# Patient Record
Sex: FEMALE | Race: BLACK OR AFRICAN AMERICAN | NOT HISPANIC OR LATINO | Employment: FULL TIME | URBAN - METROPOLITAN AREA
[De-identification: names, ages, dates, MRNs, and addresses within clinical notes are randomized per-mention and may not be internally consistent; named-entity substitution may affect disease eponyms.]

---

## 2019-10-14 ENCOUNTER — HISTORICAL (OUTPATIENT)
Dept: RADIOLOGY | Facility: HOSPITAL | Age: 54
End: 2019-10-14

## 2019-10-14 LAB — OCCULT BLOOD, FECAL, IA: NEGATIVE

## 2019-10-22 ENCOUNTER — HISTORICAL (OUTPATIENT)
Dept: RADIOLOGY | Facility: HOSPITAL | Age: 54
End: 2019-10-22

## 2019-12-13 LAB — POC BETA-HCG (QUAL): NEGATIVE

## 2020-03-12 ENCOUNTER — HISTORICAL (OUTPATIENT)
Dept: ADMINISTRATIVE | Facility: HOSPITAL | Age: 55
End: 2020-03-12

## 2020-03-12 LAB
ABS NEUT (OLG): 2.91 X10(3)/MCL (ref 2.1–9.2)
BASOPHILS # BLD AUTO: 0 X10(3)/MCL (ref 0–0.2)
BASOPHILS NFR BLD AUTO: 0 %
BUN SERPL-MCNC: 12 MG/DL (ref 7–18)
CALCIUM SERPL-MCNC: 9.3 MG/DL (ref 8.5–10.1)
CHLORIDE SERPL-SCNC: 107 MMOL/L (ref 98–107)
CO2 SERPL-SCNC: 31 MMOL/L (ref 21–32)
CREAT SERPL-MCNC: 0.9 MG/DL (ref 0.6–1.3)
CREAT/UREA NIT SERPL: 13
EOSINOPHIL # BLD AUTO: 0.1 X10(3)/MCL (ref 0–0.9)
EOSINOPHIL NFR BLD AUTO: 2 %
ERYTHROCYTE [DISTWIDTH] IN BLOOD BY AUTOMATED COUNT: 13.4 % (ref 11.5–14.5)
GLUCOSE SERPL-MCNC: 88 MG/DL (ref 74–106)
HCT VFR BLD AUTO: 41.4 % (ref 35–46)
HGB BLD-MCNC: 13.4 GM/DL (ref 12–16)
IMM GRANULOCYTES # BLD AUTO: 0.01 10*3/UL
IMM GRANULOCYTES NFR BLD AUTO: 0 %
LYMPHOCYTES # BLD AUTO: 2.3 X10(3)/MCL (ref 0.6–4.6)
LYMPHOCYTES NFR BLD AUTO: 39 %
MCH RBC QN AUTO: 28.9 PG (ref 26–34)
MCHC RBC AUTO-ENTMCNC: 32.4 GM/DL (ref 31–37)
MCV RBC AUTO: 89.2 FL (ref 80–100)
MONOCYTES # BLD AUTO: 0.5 X10(3)/MCL (ref 0.1–1.3)
MONOCYTES NFR BLD AUTO: 9 %
NEUTROPHILS # BLD AUTO: 2.91 X10(3)/MCL (ref 2.1–9.2)
NEUTROPHILS NFR BLD AUTO: 49 %
PLATELET # BLD AUTO: 271 X10(3)/MCL (ref 130–400)
PMV BLD AUTO: 10.2 FL (ref 7.4–10.4)
POTASSIUM SERPL-SCNC: 4.2 MMOL/L (ref 3.5–5.1)
RBC # BLD AUTO: 4.64 X10(6)/MCL (ref 4–5.2)
SODIUM SERPL-SCNC: 140 MMOL/L (ref 136–145)
WBC # SPEC AUTO: 5.9 X10(3)/MCL (ref 4.5–11)

## 2021-01-29 LAB
HPV16+18+H RISK 12 DNA CVX-IMP: NEGATIVE
PAP RECOMMENDATION EXT: NORMAL
PAP SMEAR: NORMAL

## 2021-02-17 ENCOUNTER — HISTORICAL (OUTPATIENT)
Dept: ADMINISTRATIVE | Facility: HOSPITAL | Age: 56
End: 2021-02-17

## 2021-02-17 LAB
ABS NEUT (OLG): 2.08 X10(3)/MCL (ref 2.1–9.2)
ALBUMIN SERPL-MCNC: 3.7 GM/DL (ref 3.5–5)
ALBUMIN/GLOB SERPL: 1.1 RATIO (ref 1.1–2)
ALP SERPL-CCNC: 76 UNIT/L (ref 40–150)
ALT SERPL-CCNC: 13 UNIT/L (ref 0–55)
APPEARANCE, UA: CLEAR
AST SERPL-CCNC: 13 UNIT/L (ref 5–34)
BACTERIA #/AREA URNS AUTO: ABNORMAL /HPF
BASOPHILS # BLD AUTO: 0 X10(3)/MCL (ref 0–0.2)
BASOPHILS NFR BLD AUTO: 1 %
BILIRUB SERPL-MCNC: 0.4 MG/DL
BILIRUB UR QL STRIP: NEGATIVE
BILIRUBIN DIRECT+TOT PNL SERPL-MCNC: 0.1 MG/DL (ref 0–0.5)
BILIRUBIN DIRECT+TOT PNL SERPL-MCNC: 0.3 MG/DL (ref 0–0.8)
BUN SERPL-MCNC: 12 MG/DL (ref 9.8–20.1)
CALCIUM SERPL-MCNC: 9.2 MG/DL (ref 8.4–10.2)
CHLORIDE SERPL-SCNC: 106 MMOL/L (ref 98–107)
CHOLEST SERPL-MCNC: 234 MG/DL
CHOLEST/HDLC SERPL: 4 {RATIO} (ref 0–5)
CO2 SERPL-SCNC: 27 MMOL/L (ref 22–29)
COLOR UR: ABNORMAL
CREAT SERPL-MCNC: 0.83 MG/DL (ref 0.55–1.02)
EOSINOPHIL # BLD AUTO: 0.1 X10(3)/MCL (ref 0–0.9)
EOSINOPHIL NFR BLD AUTO: 2 %
ERYTHROCYTE [DISTWIDTH] IN BLOOD BY AUTOMATED COUNT: 13.1 % (ref 11.5–14.5)
GLOBULIN SER-MCNC: 3.5 GM/DL (ref 2.4–3.5)
GLUCOSE (UA): NEGATIVE
GLUCOSE SERPL-MCNC: 98 MG/DL (ref 74–100)
HCT VFR BLD AUTO: 41.3 % (ref 35–46)
HDLC SERPL-MCNC: 59 MG/DL (ref 35–60)
HGB BLD-MCNC: 13.5 GM/DL (ref 12–16)
HGB UR QL STRIP: NEGATIVE
HYALINE CASTS #/AREA URNS LPF: ABNORMAL /LPF
IMM GRANULOCYTES # BLD AUTO: 0.01 10*3/UL
IMM GRANULOCYTES NFR BLD AUTO: 0 %
KETONES UR QL STRIP: NEGATIVE
LDLC SERPL CALC-MCNC: 156 MG/DL (ref 50–140)
LEUKOCYTE ESTERASE UR QL STRIP: 500 LEU/UL
LYMPHOCYTES # BLD AUTO: 2.3 X10(3)/MCL (ref 0.6–4.6)
LYMPHOCYTES NFR BLD AUTO: 47 %
MCH RBC QN AUTO: 29.3 PG (ref 26–34)
MCHC RBC AUTO-ENTMCNC: 32.7 GM/DL (ref 31–37)
MCV RBC AUTO: 89.6 FL (ref 80–100)
MONOCYTES # BLD AUTO: 0.4 X10(3)/MCL (ref 0.1–1.3)
MONOCYTES NFR BLD AUTO: 8 %
NEUTROPHILS # BLD AUTO: 2.08 X10(3)/MCL (ref 2.1–9.2)
NEUTROPHILS NFR BLD AUTO: 42 %
NITRITE UR QL STRIP: NEGATIVE
PH UR STRIP: 5.5 [PH] (ref 4.5–8)
PLATELET # BLD AUTO: 238 X10(3)/MCL (ref 130–400)
PMV BLD AUTO: 9.9 FL (ref 7.4–10.4)
POTASSIUM SERPL-SCNC: 4 MMOL/L (ref 3.5–5.1)
PROT SERPL-MCNC: 7.2 GM/DL (ref 6.4–8.3)
PROT UR QL STRIP: NEGATIVE
RBC # BLD AUTO: 4.61 X10(6)/MCL (ref 4–5.2)
RBC #/AREA URNS AUTO: ABNORMAL /HPF
SODIUM SERPL-SCNC: 141 MMOL/L (ref 136–145)
SP GR UR STRIP: 1.02 (ref 1–1.03)
SQUAMOUS #/AREA URNS LPF: >100 /LPF
TRIGL SERPL-MCNC: 96 MG/DL (ref 37–140)
UROBILINOGEN UR STRIP-ACNC: NORMAL
VLDLC SERPL CALC-MCNC: 19 MG/DL
WBC # SPEC AUTO: 4.9 X10(3)/MCL (ref 4.5–11)
WBC #/AREA URNS AUTO: ABNORMAL /HPF

## 2021-02-19 LAB — FINAL CULTURE: NO GROWTH

## 2021-02-25 ENCOUNTER — HISTORICAL (OUTPATIENT)
Dept: RADIOLOGY | Facility: HOSPITAL | Age: 56
End: 2021-02-25

## 2021-05-27 ENCOUNTER — HISTORICAL (OUTPATIENT)
Dept: LAB | Facility: HOSPITAL | Age: 56
End: 2021-05-27

## 2021-05-27 LAB
ALBUMIN SERPL-MCNC: 3.9 GM/DL (ref 3.5–5)
ALBUMIN/GLOB SERPL: 1 RATIO (ref 1.1–2)
ALP SERPL-CCNC: 83 UNIT/L (ref 40–150)
ALT SERPL-CCNC: 15 UNIT/L (ref 0–55)
AST SERPL-CCNC: 14 UNIT/L (ref 5–34)
BILIRUB SERPL-MCNC: 0.5 MG/DL
BILIRUBIN DIRECT+TOT PNL SERPL-MCNC: 0.2 MG/DL (ref 0–0.5)
BILIRUBIN DIRECT+TOT PNL SERPL-MCNC: 0.3 MG/DL (ref 0–0.8)
BUN SERPL-MCNC: 11.7 MG/DL (ref 9.8–20.1)
CALCIUM SERPL-MCNC: 10 MG/DL (ref 8.4–10.2)
CHLORIDE SERPL-SCNC: 105 MMOL/L (ref 98–107)
CHOLEST SERPL-MCNC: 234 MG/DL
CHOLEST/HDLC SERPL: 4 {RATIO} (ref 0–5)
CO2 SERPL-SCNC: 27 MMOL/L (ref 22–29)
CREAT SERPL-MCNC: 0.92 MG/DL (ref 0.55–1.02)
GLOBULIN SER-MCNC: 3.9 GM/DL (ref 2.4–3.5)
GLUCOSE SERPL-MCNC: 92 MG/DL (ref 74–100)
HDLC SERPL-MCNC: 52 MG/DL (ref 35–60)
LDLC SERPL CALC-MCNC: 166 MG/DL (ref 50–140)
POTASSIUM SERPL-SCNC: 4.1 MMOL/L (ref 3.5–5.1)
PROT SERPL-MCNC: 7.8 GM/DL (ref 6.4–8.3)
SODIUM SERPL-SCNC: 141 MMOL/L (ref 136–145)
TRIGL SERPL-MCNC: 81 MG/DL (ref 37–140)
VLDLC SERPL CALC-MCNC: 16 MG/DL

## 2021-09-10 ENCOUNTER — HISTORICAL (OUTPATIENT)
Dept: ADMINISTRATIVE | Facility: HOSPITAL | Age: 56
End: 2021-09-10

## 2021-09-10 LAB
ALBUMIN SERPL-MCNC: 4 GM/DL (ref 3.5–5)
ALBUMIN/GLOB SERPL: 1.1 RATIO (ref 1.1–2)
ALP SERPL-CCNC: 88 UNIT/L (ref 40–150)
ALT SERPL-CCNC: 14 UNIT/L (ref 0–55)
AST SERPL-CCNC: 14 UNIT/L (ref 5–34)
BILIRUB SERPL-MCNC: 0.4 MG/DL
BILIRUBIN DIRECT+TOT PNL SERPL-MCNC: 0.2 MG/DL (ref 0–0.5)
BILIRUBIN DIRECT+TOT PNL SERPL-MCNC: 0.2 MG/DL (ref 0–0.8)
BUN SERPL-MCNC: 11.6 MG/DL (ref 9.8–20.1)
CALCIUM SERPL-MCNC: 10 MG/DL (ref 8.4–10.2)
CHLORIDE SERPL-SCNC: 108 MMOL/L (ref 98–107)
CHOLEST SERPL-MCNC: 208 MG/DL
CHOLEST/HDLC SERPL: 4 {RATIO} (ref 0–5)
CO2 SERPL-SCNC: 26 MMOL/L (ref 22–29)
CREAT SERPL-MCNC: 0.9 MG/DL (ref 0.55–1.02)
GLOBULIN SER-MCNC: 3.7 GM/DL (ref 2.4–3.5)
GLUCOSE SERPL-MCNC: 96 MG/DL (ref 74–100)
HDLC SERPL-MCNC: 55 MG/DL (ref 35–60)
LDLC SERPL CALC-MCNC: 138 MG/DL (ref 50–140)
POTASSIUM SERPL-SCNC: 3.9 MMOL/L (ref 3.5–5.1)
PROT SERPL-MCNC: 7.7 GM/DL (ref 6.4–8.3)
SODIUM SERPL-SCNC: 141 MMOL/L (ref 136–145)
TRIGL SERPL-MCNC: 73 MG/DL (ref 37–140)
VLDLC SERPL CALC-MCNC: 15 MG/DL

## 2022-01-03 ENCOUNTER — HISTORICAL (OUTPATIENT)
Dept: ADMINISTRATIVE | Facility: HOSPITAL | Age: 57
End: 2022-01-03

## 2022-01-03 LAB
ABS NEUT (OLG): 3.16 X10(3)/MCL (ref 2.1–9.2)
ALBUMIN SERPL-MCNC: 3.9 GM/DL (ref 3.5–5)
ALBUMIN/GLOB SERPL: 1 RATIO (ref 1.1–2)
ALP SERPL-CCNC: 91 UNIT/L (ref 40–150)
ALT SERPL-CCNC: 13 UNIT/L (ref 0–55)
APPEARANCE, UA: CLEAR
AST SERPL-CCNC: 15 UNIT/L (ref 5–34)
BACTERIA SPEC CULT: ABNORMAL
BASOPHILS # BLD AUTO: 0 X10(3)/MCL (ref 0–0.2)
BASOPHILS NFR BLD AUTO: 1 %
BILIRUB SERPL-MCNC: 0.5 MG/DL
BILIRUB UR QL STRIP: NEGATIVE
BILIRUBIN DIRECT+TOT PNL SERPL-MCNC: 0.2 MG/DL (ref 0–0.5)
BILIRUBIN DIRECT+TOT PNL SERPL-MCNC: 0.3 MG/DL (ref 0–0.8)
BUN SERPL-MCNC: 10.9 MG/DL (ref 9.8–20.1)
CALCIUM SERPL-MCNC: 9.8 MG/DL (ref 8.7–10.5)
CHLORIDE SERPL-SCNC: 106 MMOL/L (ref 98–107)
CHOLEST SERPL-MCNC: 235 MG/DL
CHOLEST/HDLC SERPL: 4 {RATIO} (ref 0–5)
CO2 SERPL-SCNC: 26 MMOL/L (ref 22–29)
COLOR UR: ABNORMAL
CREAT SERPL-MCNC: 0.84 MG/DL (ref 0.55–1.02)
EOSINOPHIL # BLD AUTO: 0.1 X10(3)/MCL (ref 0–0.9)
EOSINOPHIL NFR BLD AUTO: 2 %
ERYTHROCYTE [DISTWIDTH] IN BLOOD BY AUTOMATED COUNT: 13.7 % (ref 11.5–14.5)
GLOBULIN SER-MCNC: 4 GM/DL (ref 2.4–3.5)
GLUCOSE (UA): NORMAL /UL
GLUCOSE SERPL-MCNC: 97 MG/DL (ref 74–100)
HCT VFR BLD AUTO: 42.8 % (ref 35–46)
HDLC SERPL-MCNC: 56 MG/DL (ref 35–60)
HGB BLD-MCNC: 14.3 GM/DL (ref 12–16)
HGB UR QL STRIP: NEGATIVE /HPF
HYALINE CASTS #/AREA URNS LPF: ABNORMAL /LPF
IMM GRANULOCYTES # BLD AUTO: 0.02 10*3/UL
IMM GRANULOCYTES NFR BLD AUTO: 0 %
KETONES UR QL STRIP: NEGATIVE /UL
LDLC SERPL CALC-MCNC: 158 MG/DL (ref 50–140)
LEUKOCYTE ESTERASE UR QL STRIP: 500
LYMPHOCYTES # BLD AUTO: 2 X10(3)/MCL (ref 0.6–4.6)
LYMPHOCYTES NFR BLD AUTO: 35 %
MCH RBC QN AUTO: 29.9 PG (ref 26–34)
MCHC RBC AUTO-ENTMCNC: 33.4 GM/DL (ref 31–37)
MCV RBC AUTO: 89.4 FL (ref 80–100)
MONOCYTES # BLD AUTO: 0.4 X10(3)/MCL (ref 0.1–1.3)
MONOCYTES NFR BLD AUTO: 7 %
MUCOUS THREADS URNS QL MICRO: ABNORMAL /LPF
NEUTROPHILS # BLD AUTO: 3.16 X10(3)/MCL (ref 2.1–9.2)
NEUTROPHILS NFR BLD AUTO: 55 %
NITRITE UR QL STRIP: NEGATIVE
NRBC BLD AUTO-RTO: 0 % (ref 0–0.2)
PH UR STRIP: 5.5 /UL (ref 4.5–8)
PLATELET # BLD AUTO: 254 X10(3)/MCL (ref 130–400)
PMV BLD AUTO: 9.9 FL (ref 7.4–10.4)
POTASSIUM SERPL-SCNC: 4.1 MMOL/L (ref 3.5–5.1)
PROT SERPL-MCNC: 7.9 GM/DL (ref 6.4–8.3)
PROT UR QL STRIP: NEGATIVE /UL
RBC # BLD AUTO: 4.79 X10(6)/MCL (ref 4–5.2)
RBC #/AREA URNS HPF: ABNORMAL /HPF
SODIUM SERPL-SCNC: 139 MMOL/L (ref 136–145)
SP GR UR STRIP: 1.02 (ref 1–1.03)
SQUAMOUS EPITHELIAL, UA: ABNORMAL /LPF
TRIGL SERPL-MCNC: 105 MG/DL (ref 37–140)
UROBILINOGEN UR STRIP-ACNC: NORMAL /HPF
VLDLC SERPL CALC-MCNC: 21 MG/DL
WBC # SPEC AUTO: 5.8 X10(3)/MCL (ref 4.5–11)
WBC #/AREA URNS HPF: ABNORMAL /HPF

## 2022-01-05 LAB — FINAL CULTURE: NORMAL

## 2022-03-15 ENCOUNTER — HISTORICAL (OUTPATIENT)
Dept: ADMINISTRATIVE | Facility: HOSPITAL | Age: 57
End: 2022-03-15

## 2022-03-15 ENCOUNTER — HISTORICAL (OUTPATIENT)
Dept: RADIOLOGY | Facility: HOSPITAL | Age: 57
End: 2022-03-15

## 2022-04-01 ENCOUNTER — HISTORICAL (OUTPATIENT)
Dept: WOUND CARE | Facility: HOSPITAL | Age: 57
End: 2022-04-01

## 2022-04-01 LAB
ALBUMIN SERPL-MCNC: 3.9 G/DL (ref 3.5–5)
ALBUMIN/GLOB SERPL: 1 {RATIO} (ref 1.1–2)
ALP SERPL-CCNC: 93 U/L (ref 40–150)
ALT SERPL-CCNC: 29 U/L (ref 0–55)
AST SERPL-CCNC: 21 U/L (ref 5–34)
BILIRUB SERPL-MCNC: 0.4 MG/DL
BILIRUBIN DIRECT+TOT PNL SERPL-MCNC: 0.2 (ref 0–0.5)
BILIRUBIN DIRECT+TOT PNL SERPL-MCNC: 0.2 (ref 0–0.8)
BUN SERPL-MCNC: 9.8 MG/DL (ref 9.8–20.1)
CALCIUM SERPL-MCNC: 9.7 MG/DL (ref 8.7–10.5)
CHLORIDE SERPL-SCNC: 105 MMOL/L (ref 98–107)
CHOLEST SERPL-MCNC: 189 MG/DL
CHOLEST/HDLC SERPL: 3 {RATIO} (ref 0–5)
CO2 SERPL-SCNC: 26 MMOL/L (ref 22–29)
CREAT SERPL-MCNC: 0.84 MG/DL (ref 0.55–1.02)
GLOBULIN SER-MCNC: 3.8 G/DL (ref 2.4–3.5)
GLUCOSE SERPL-MCNC: 90 MG/DL (ref 74–100)
HDLC SERPL-MCNC: 57 MG/DL (ref 35–60)
ICTERIC INTERF INDEX SERPL-ACNC: 1
LDLC SERPL CALC-MCNC: 118 MG/DL (ref 50–140)
LIPEMIC INTERF INDEX SERPL-ACNC: 5
POTASSIUM SERPL-SCNC: 4.2 MMOL/L (ref 3.5–5.1)
PROT SERPL-MCNC: 7.7 G/DL (ref 6.4–8.3)
SODIUM SERPL-SCNC: 139 MMOL/L (ref 136–145)
TRIGL SERPL-MCNC: 72 MG/DL (ref 37–140)
VLDLC SERPL CALC-MCNC: 14 MG/DL

## 2022-04-11 ENCOUNTER — HISTORICAL (OUTPATIENT)
Dept: ADMINISTRATIVE | Facility: HOSPITAL | Age: 57
End: 2022-04-11
Payer: MEDICAID

## 2022-04-24 VITALS
WEIGHT: 185.44 LBS | BODY MASS INDEX: 29.1 KG/M2 | HEIGHT: 67 IN | SYSTOLIC BLOOD PRESSURE: 109 MMHG | OXYGEN SATURATION: 99 % | DIASTOLIC BLOOD PRESSURE: 74 MMHG

## 2022-05-18 ENCOUNTER — HOSPITAL ENCOUNTER (EMERGENCY)
Facility: HOSPITAL | Age: 57
Discharge: HOME | End: 2022-05-18
Attending: EMERGENCY MEDICINE
Payer: MEDICAID

## 2022-05-18 VITALS
WEIGHT: 183 LBS | HEIGHT: 67 IN | TEMPERATURE: 98 F | DIASTOLIC BLOOD PRESSURE: 89 MMHG | HEART RATE: 67 BPM | BODY MASS INDEX: 28.72 KG/M2 | OXYGEN SATURATION: 98 % | RESPIRATION RATE: 18 BRPM | SYSTOLIC BLOOD PRESSURE: 145 MMHG

## 2022-05-18 DIAGNOSIS — H60.501 ACUTE OTITIS EXTERNA OF RIGHT EAR, UNSPECIFIED TYPE: Primary | ICD-10-CM

## 2022-05-18 PROCEDURE — 99283 EMERGENCY DEPT VISIT LOW MDM: CPT

## 2022-05-18 RX ORDER — TRAZODONE HYDROCHLORIDE 50 MG/1
50 TABLET ORAL NIGHTLY PRN
COMMUNITY
Start: 2022-04-21 | End: 2023-04-26 | Stop reason: SDUPTHER

## 2022-05-18 RX ORDER — PRAVASTATIN SODIUM 40 MG/1
40 TABLET ORAL DAILY
COMMUNITY
Start: 2022-04-21 | End: 2023-04-26 | Stop reason: SDUPTHER

## 2022-05-18 RX ORDER — PREGABALIN 75 MG/1
75 CAPSULE ORAL 2 TIMES DAILY
COMMUNITY
Start: 2022-04-06 | End: 2023-04-26

## 2022-05-18 RX ORDER — SERTRALINE HYDROCHLORIDE 50 MG/1
50 TABLET, FILM COATED ORAL DAILY
COMMUNITY
Start: 2021-11-23 | End: 2022-09-01

## 2022-05-18 RX ORDER — TIZANIDINE 4 MG/1
4 TABLET ORAL NIGHTLY PRN
COMMUNITY
Start: 2022-03-02 | End: 2023-04-26 | Stop reason: SDUPTHER

## 2022-05-18 RX ORDER — NEOMYCIN SULFATE, POLYMYXIN B SULFATE AND HYDROCORTISONE 10; 3.5; 1 MG/ML; MG/ML; [USP'U]/ML
4 SUSPENSION/ DROPS AURICULAR (OTIC) 4 TIMES DAILY
Qty: 10 ML | Refills: 0 | OUTPATIENT
Start: 2022-05-18 | End: 2022-07-11

## 2022-05-19 NOTE — ED PROVIDER NOTES
Encounter Date: 5/18/2022       History     Chief Complaint   Patient presents with    Foreign Body in Ear     The history is provided by the patient. No  was used.   Otalgia  This is a new problem. The current episode started several weeks ago. There is pain in the right ear. The problem occurs constantly. The problem has been unchanged. Pertinent negatives include no sore throat and no rash.   Tried using Q-tips but symptoms persist.    Review of patient's allergies indicates:  No Known Allergies  No past medical history on file.  Chronic back pain  No past surgical history on file.  noncontributory  No family history on file.     Review of Systems   Constitutional: Negative for fever.   HENT: Positive for ear pain. Negative for sore throat.    Respiratory: Negative for shortness of breath.    Cardiovascular: Negative for chest pain.   Gastrointestinal: Negative for nausea.   Genitourinary: Negative for dysuria.   Musculoskeletal: Negative for back pain.   Skin: Negative for rash.   Neurological: Negative for weakness.   Hematological: Does not bruise/bleed easily.   All other systems reviewed and are negative.      Physical Exam     Initial Vitals [05/18/22 2247]   BP Pulse Resp Temp SpO2   (!) 145/89 67 18 98.4 °F (36.9 °C) 98 %      MAP       --         Physical Exam    Nursing note and vitals reviewed.  Constitutional: She appears well-developed and well-nourished.   HENT:   Head: Normocephalic and atraumatic.   Right Ear: External ear normal.   Left Ear: External ear normal.   Mild erythema and edema of right external auditory canal; small amount of cerumen; TM is unremarkable   Eyes: Conjunctivae and EOM are normal. Pupils are equal, round, and reactive to light.   Neck: Neck supple.   Normal range of motion.  Cardiovascular: Normal rate, regular rhythm, normal heart sounds and intact distal pulses.   Pulmonary/Chest: Breath sounds normal.   Abdominal: Abdomen is soft. Bowel sounds are  normal.   Musculoskeletal:         General: Normal range of motion.      Cervical back: Normal range of motion and neck supple.     Neurological: She is alert and oriented to person, place, and time. GCS score is 15. GCS eye subscore is 4. GCS verbal subscore is 5. GCS motor subscore is 6.   Skin: Skin is warm and dry. Capillary refill takes less than 2 seconds.   Psychiatric: She has a normal mood and affect. Her behavior is normal. Judgment and thought content normal.         ED Course   Procedures  Labs Reviewed - No data to display       Imaging Results    None          Medications - No data to display                       Clinical Impression:   Final diagnoses:  [H60.501] Acute otitis externa of right ear, unspecified type (Primary)          ED Disposition Condition    Discharge Stable        ED Prescriptions     Medication Sig Dispense Start Date End Date Auth. Provider    neomycin-polymyxin-hydrocortisone (CORTISPORIN) 3.5-10,000-1 mg/mL-unit/mL-% otic suspension Place 4 drops into the right ear 4 (four) times daily. 10 mL 5/18/2022  Travis Reynoso MD        Follow-up Information     Follow up With Specialties Details Why Contact Info    Follow up with your primary MD in 3-5 days if not improved.  Return to ED for worsening symptoms.               Travis Reynoso MD  05/18/22 0876

## 2022-06-30 ENCOUNTER — OFFICE VISIT (OUTPATIENT)
Dept: SURGERY | Facility: CLINIC | Age: 57
End: 2022-06-30
Payer: MEDICAID

## 2022-06-30 VITALS
WEIGHT: 180.63 LBS | HEART RATE: 78 BPM | TEMPERATURE: 98 F | BODY MASS INDEX: 28.35 KG/M2 | SYSTOLIC BLOOD PRESSURE: 129 MMHG | DIASTOLIC BLOOD PRESSURE: 84 MMHG | RESPIRATION RATE: 18 BRPM | OXYGEN SATURATION: 96 % | HEIGHT: 67 IN

## 2022-06-30 DIAGNOSIS — N64.4 BREAST PAIN: Primary | ICD-10-CM

## 2022-06-30 PROCEDURE — 99215 OFFICE O/P EST HI 40 MIN: CPT | Mod: PBBFAC

## 2022-06-30 RX ORDER — DICLOFENAC SODIUM 50 MG/1
50 TABLET, DELAYED RELEASE ORAL 2 TIMES DAILY
COMMUNITY
End: 2022-09-29

## 2022-06-30 RX ORDER — HYDROCODONE BITARTRATE AND ACETAMINOPHEN 10; 325 MG/1; MG/1
1 TABLET ORAL 2 TIMES DAILY
COMMUNITY
Start: 2022-06-03 | End: 2023-04-26

## 2022-06-30 RX ORDER — METHOCARBAMOL 500 MG/1
500 TABLET, FILM COATED ORAL 3 TIMES DAILY
COMMUNITY
End: 2022-09-01

## 2022-06-30 NOTE — PROGRESS NOTES
Breast Surgery   Clinic Note     HD#0  POD#* No surgery found *    Subjective  Patient doing well since time of last visit in March. She reports that her right breast pain has nearly resolved. She denies use of hormonal medications. Last menstrual period was over 5 years ago. She states that she may feel a lump in her right breast but she is unsure. Patient is reportedly otherwise in good health. She denies fevers, chills, nausea, vomiting, chest pain, SOB, constipation, diarrhea, new onset motor/sensory changes. Further she denies breast skin changes, nipple changes, discharge from the breast.     Scheduled Meds:    Continuous Infusions:    PRN Meds:     Objective   GEN: NAD  NEURO: AAOx3  RESP: No increased WOB  CV: RR  ABD: Soft, NT, ND. No guarding or rebound  : Noel none  EXT: moving all spontaneously  BREAST: no abnormalities noted bilaterally upon breast examination. No palpable masses, skin changes, discharge, nipple changes/inversion     Labs  No results for input(s): WBC, HGB, HCT, PLT, PTT, INR in the last 72 hours.  No results for input(s): NA, K, CL, CO2, BUN, CREATININE, GLU, CALCIUM, MG, PHOS, PROT, ALBUMIN, BILITOT, AST, ALKPHOS, ALT in the last 72 hours.    Imaging  No interval change     Assessment/Plan  55yo F who presents for breast pain follow up. Her symptoms have resolved with use of topical diclofenac.    - RTC in 6 months with screening mammogram and breast ultrasound  - PRN with any concerns prior to that time    Torres Zamarripa MD  [facility]      6/30/2022  2:24 PM

## 2022-06-30 NOTE — PROGRESS NOTES
Patient seen by Dr. Zamarripa. RTC 6 months with mammogram and ultrasound. Discharge instructions given verbal.

## 2022-07-11 ENCOUNTER — HOSPITAL ENCOUNTER (EMERGENCY)
Facility: HOSPITAL | Age: 57
Discharge: HOME | End: 2022-07-11
Attending: INTERNAL MEDICINE
Payer: MEDICAID

## 2022-07-11 VITALS
TEMPERATURE: 98 F | HEIGHT: 67 IN | SYSTOLIC BLOOD PRESSURE: 128 MMHG | RESPIRATION RATE: 20 BRPM | WEIGHT: 182.13 LBS | OXYGEN SATURATION: 99 % | BODY MASS INDEX: 28.58 KG/M2 | HEART RATE: 67 BPM | DIASTOLIC BLOOD PRESSURE: 85 MMHG

## 2022-07-11 DIAGNOSIS — H61.21 IMPACTED CERUMEN OF RIGHT EAR: Primary | ICD-10-CM

## 2022-07-11 PROCEDURE — 99283 EMERGENCY DEPT VISIT LOW MDM: CPT | Mod: 25

## 2022-07-11 PROCEDURE — 69210 REMOVE IMPACTED EAR WAX UNI: CPT

## 2022-07-11 RX ORDER — NEOMYCIN SULFATE, POLYMYXIN B SULFATE AND HYDROCORTISONE 10; 3.5; 1 MG/ML; MG/ML; [USP'U]/ML
4 SUSPENSION/ DROPS AURICULAR (OTIC) 3 TIMES DAILY
Qty: 10 ML | Refills: 0 | Status: SHIPPED | OUTPATIENT
Start: 2022-07-11 | End: 2022-07-16

## 2022-07-11 NOTE — ED PROVIDER NOTES
Encounter Date: 7/11/2022       History     Chief Complaint   Patient presents with    Otalgia     C/o continued right ear pain x 2 months. States has been to ER x 3 with same problem with no relief.      Francis Champagne is a 56 y.o. female who presents to the ED with complaints of right ear pain that started 2 month(s) ago.  She states this is her 3rd ER visit for the same complaint without relief of symptoms with medications given at past visits. She denies ever seeing an ENT.       The history is provided by the patient. No  was used.     Review of patient's allergies indicates:  No Known Allergies  Past Medical History:   Diagnosis Date    Hyperlipidemia      Past Surgical History:   Procedure Laterality Date    BACK SURGERY       Family History   Problem Relation Age of Onset    Hyperlipidemia Mother     Hypertension Mother     Cancer Mother     Hyperlipidemia Maternal Aunt     Hypertension Maternal Aunt     Cancer Maternal Aunt     Hyperlipidemia Maternal Uncle     Cancer Maternal Uncle      Social History     Tobacco Use    Smoking status: Never Smoker    Smokeless tobacco: Never Used   Substance Use Topics    Alcohol use: Yes     Comment: ocassionally    Drug use: Never     Review of Systems   Constitutional: Negative for chills, fatigue and fever.   HENT: Positive for ear pain. Negative for congestion, rhinorrhea, sinus pain and sore throat.    Eyes: Negative for pain.   Respiratory: Negative for cough, chest tightness and shortness of breath.    Cardiovascular: Negative for chest pain.   Gastrointestinal: Negative for abdominal pain, constipation, diarrhea, nausea and vomiting.   Genitourinary: Negative for dysuria.   Musculoskeletal: Negative for back pain and joint swelling.   Skin: Negative for color change and rash.   Neurological: Negative for dizziness and weakness.   Psychiatric/Behavioral: Negative for behavioral problems and confusion.       Physical Exam      Initial Vitals [07/11/22 1400]   BP Pulse Resp Temp SpO2   128/85 67 20 98.4 °F (36.9 °C) 99 %      MAP       --         Physical Exam    Constitutional: She appears well-developed and well-nourished.   HENT:   Head: Normocephalic and atraumatic.   Left Ear: External ear normal.   Nose: Nose normal.   Unable to visualize R TM due to cerumen impaction   Eyes: EOM are normal. Pupils are equal, round, and reactive to light.   Neck: Neck supple. No thyromegaly present. No JVD present.   Normal range of motion.  Cardiovascular: Normal rate, regular rhythm, normal heart sounds and intact distal pulses.   No murmur heard.  Pulmonary/Chest: Breath sounds normal. No respiratory distress. She has no wheezes. She has no rhonchi. She has no rales. She exhibits no tenderness.   Abdominal: Abdomen is soft. Bowel sounds are normal. She exhibits no distension. There is no abdominal tenderness. There is no rebound and no guarding.   Musculoskeletal:         General: No tenderness or edema. Normal range of motion.      Cervical back: Normal range of motion and neck supple.     Lymphadenopathy:     She has no cervical adenopathy.   Neurological: She is alert and oriented to person, place, and time.   Skin: Skin is warm and dry. Capillary refill takes less than 2 seconds.   Psychiatric: She has a normal mood and affect. Thought content normal.         ED Course   Ear Wax Removal    Date/Time: 7/11/2022 3:00 PM  Performed by: Jamilah Calderon PA-C  Authorized by: Jamilah Calderon PA-C     Anesthesia:  Local Anesthetic: none  Location details: right ear  Procedure type: irrigation Cerumen Removal Results: Cerumen completely removed.  Patient tolerance: Patient tolerated the procedure well with no immediate complications        Labs Reviewed - No data to display       Imaging Results    None          Medications - No data to display              ED Course as of 07/11/22 1504   Mon Jul 11, 2022   1500 Reassessed patient at this  time. She is feeling complete symptom relief after ear wax removed from R ear using irrigation. I will rx drops for canal irritation from the irrigation. Strict return precautions given. Stable for discharge.  [VJ]      ED Course User Index  [VJ] Jamilah Calderon PA-C             Clinical Impression:   Final diagnoses:  [H61.21] Impacted cerumen of right ear (Primary)          ED Disposition Condition    Discharge Stable        ED Prescriptions     Medication Sig Dispense Start Date End Date Auth. Provider    neomycin-polymyxin-hydrocortisone (CORTISPORIN) 3.5-10,000-1 mg/mL-unit/mL-% otic suspension Place 4 drops into the right ear 3 (three) times daily. for 5 days 10 mL 7/11/2022 7/16/2022 Jamilah Calderon PA-C        Follow-up Information     Follow up With Specialties Details Why Contact Info    [facility] Dept Emergency Medicine In 3 days As needed, If symptoms worsen [address] [facility] [id number] [phone]    Kathrin Townsend MD Internal Medicine   [address] [facility] [address]  [phone]             Jamilah Calderon PA-C  07/11/22 1694

## 2022-09-01 ENCOUNTER — OFFICE VISIT (OUTPATIENT)
Dept: FAMILY MEDICINE | Facility: CLINIC | Age: 57
End: 2022-09-01
Payer: MEDICAID

## 2022-09-01 VITALS
RESPIRATION RATE: 18 BRPM | BODY MASS INDEX: 28.25 KG/M2 | HEIGHT: 67 IN | DIASTOLIC BLOOD PRESSURE: 85 MMHG | SYSTOLIC BLOOD PRESSURE: 138 MMHG | WEIGHT: 180 LBS | OXYGEN SATURATION: 99 % | TEMPERATURE: 98 F | HEART RATE: 65 BPM

## 2022-09-01 DIAGNOSIS — R60.0 LOWER EXTREMITY EDEMA: ICD-10-CM

## 2022-09-01 DIAGNOSIS — G89.4 CHRONIC PAIN SYNDROME: ICD-10-CM

## 2022-09-01 DIAGNOSIS — Z12.11 COLON CANCER SCREENING: ICD-10-CM

## 2022-09-01 DIAGNOSIS — E78.5 HYPERLIPIDEMIA, UNSPECIFIED HYPERLIPIDEMIA TYPE: Primary | ICD-10-CM

## 2022-09-01 PROCEDURE — 99214 OFFICE O/P EST MOD 30 MIN: CPT | Mod: PBBFAC | Performed by: FAMILY MEDICINE

## 2022-09-01 PROCEDURE — 3079F PR MOST RECENT DIASTOLIC BLOOD PRESSURE 80-89 MM HG: ICD-10-PCS | Mod: CPTII,,, | Performed by: FAMILY MEDICINE

## 2022-09-01 PROCEDURE — 1159F MED LIST DOCD IN RCRD: CPT | Mod: CPTII,,, | Performed by: FAMILY MEDICINE

## 2022-09-01 PROCEDURE — 3079F DIAST BP 80-89 MM HG: CPT | Mod: CPTII,,, | Performed by: FAMILY MEDICINE

## 2022-09-01 PROCEDURE — 99204 OFFICE O/P NEW MOD 45 MIN: CPT | Mod: S$PBB,,, | Performed by: FAMILY MEDICINE

## 2022-09-01 PROCEDURE — 1159F PR MEDICATION LIST DOCUMENTED IN MEDICAL RECORD: ICD-10-PCS | Mod: CPTII,,, | Performed by: FAMILY MEDICINE

## 2022-09-01 PROCEDURE — 3008F BODY MASS INDEX DOCD: CPT | Mod: CPTII,,, | Performed by: FAMILY MEDICINE

## 2022-09-01 PROCEDURE — 3075F PR MOST RECENT SYSTOLIC BLOOD PRESS GE 130-139MM HG: ICD-10-PCS | Mod: CPTII,,, | Performed by: FAMILY MEDICINE

## 2022-09-01 PROCEDURE — 3008F PR BODY MASS INDEX (BMI) DOCUMENTED: ICD-10-PCS | Mod: CPTII,,, | Performed by: FAMILY MEDICINE

## 2022-09-01 PROCEDURE — 3075F SYST BP GE 130 - 139MM HG: CPT | Mod: CPTII,,, | Performed by: FAMILY MEDICINE

## 2022-09-01 PROCEDURE — 99204 PR OFFICE/OUTPT VISIT, NEW, LEVL IV, 45-59 MIN: ICD-10-PCS | Mod: S$PBB,,, | Performed by: FAMILY MEDICINE

## 2022-09-01 RX ORDER — GABAPENTIN 300 MG/1
300 CAPSULE ORAL 3 TIMES DAILY
Qty: 90 CAPSULE | Refills: 2 | Status: SHIPPED | OUTPATIENT
Start: 2022-09-01 | End: 2023-04-26

## 2022-09-01 NOTE — PROGRESS NOTES
Francis Champagne  09/01/2022  [id number]              Chief Complaint:  Chief Complaint   Patient presents with    Hypertension    Foot Swelling       History of Present Illness:    57 yo f presents to [facility] care. She has PMH HLD. Previously followed with Dr. Townsend. Reporting feet swelling and pain after working. Reports standing on feet 7+ hours. Denied chest pain, PND, orthopnea, palpitations. Feet swelling relieved with elevation.  Previously followed by pain management; requesting lyrica for pain    History:  Past Medical History:   Diagnosis Date    Hyperlipidemia      Past Surgical History:   Procedure Laterality Date    BACK SURGERY       Family History   Problem Relation Age of Onset    Hyperlipidemia Mother     Hypertension Mother     Cancer Mother     Hyperlipidemia Maternal Aunt     Hypertension Maternal Aunt     Cancer Maternal Aunt     Hyperlipidemia Maternal Uncle     Cancer Maternal Uncle      Social History     Socioeconomic History    Marital status: Single   Tobacco Use    Smoking status: Never    Smokeless tobacco: Never   Substance and Sexual Activity    Alcohol use: Yes     Comment: ocassionally    Drug use: Never     There is no problem list on file for this patient.    Review of patient's allergies indicates:  No Known Allergies      Medications:  Current Outpatient Medications on File Prior to Visit   Medication Sig Dispense Refill    pravastatin (PRAVACHOL) 40 MG tablet Take 40 mg by mouth once daily.      pregabalin (LYRICA) 75 MG capsule Take 75 mg by mouth 2 (two) times daily.      diclofenac (VOLTAREN) 50 MG EC tablet Take 50 mg by mouth 2 (two) times daily.      HYDROcodone-acetaminophen (NORCO)  mg per tablet Take 1 tablet by mouth 2 (two) times daily.      tiZANidine (ZANAFLEX) 4 MG tablet Take 4 mg by mouth nightly as needed.      traZODone (DESYREL) 50 MG tablet Take 50 mg by mouth nightly as needed.      [DISCONTINUED] methocarbamoL (ROBAXIN) 500 MG Tab Take 500 mg by mouth 3  (three) times daily.      [DISCONTINUED] sertraline (ZOLOFT) 50 MG tablet Take 50 mg by mouth once daily.       No current facility-administered medications on file prior to visit.       Medications have been reviewed and reconciled with patient at this visit.    Adverse reactions to current medications reviewed with patient..      Exam:  Wt Readings from Last 3 Encounters:   09/01/22 81.6 kg (180 lb)   07/11/22 82.6 kg (182 lb 1.6 oz)   06/30/22 81.9 kg (180 lb 9.6 oz)     Temp Readings from Last 3 Encounters:   09/01/22 97.7 °F (36.5 °C) (Oral)   07/11/22 98.4 °F (36.9 °C) (Temporal)   06/30/22 98.1 °F (36.7 °C) (Oral)     BP Readings from Last 3 Encounters:   09/01/22 138/85   07/11/22 128/85   06/30/22 129/84     Pulse Readings from Last 3 Encounters:   09/01/22 65   07/11/22 67   06/30/22 78     Body mass index is 28.19 kg/m².      ROS:  See HPI for details        All Other ROS: Negative except as stated in HPI.    PE:  General: Alert and oriented, No acute distress.  Head: Normocephalic, Atraumatic.  Eye: Pupils are equal, round and reactive to light, Extraocular movements are intact, Sclera non-icteric.  Ears/Nose/Throat: Normal, Mucosa moist,Clear.  Neck/Thyroid: Supple, Non-tender, No carotid bruit, No palpable thyromegaly or thyroid nodule, No lymphadenopathy, No JVD, Full range of motion.  Respiratory: Clear to auscultation bilaterally; No wheezes, rales or rhonchi,Non-labored respirations, Symmetrical chest wall expansion.  Cardiovascular: Regular rate and rhythm, S1/S2 normal, No murmurs, rubs or gallops.  Gastrointestinal: Soft, Non-tender, Non-distended, Normal bowel sounds, No palpable organomegaly.  Musculoskeletal: Normal range of motion.  Integumentary: Warm, Dry, Intact, No suspicious lesions or rashes.  Extremities: No clubbing, cyanosis or edema      Laboratory Reviewed ({Yes)  Lab Results   Component Value Date    WBC 5.8 01/03/2022    HGB 14.3 01/03/2022    HCT 42.8 01/03/2022      01/03/2022    CHOL 189 04/01/2022    TRIG 72 04/01/2022    HDL 57 04/01/2022    ALT 29 04/01/2022    AST 21 04/01/2022     04/01/2022    K 4.2 04/01/2022    CREATININE 0.84 04/01/2022    BUN 9.8 04/01/2022    CO2 26 04/01/2022       Francis was seen today for hypertension and foot swelling.    Diagnoses and all orders for this visit:    Hyperlipidemia, unspecified hyperlipidemia type    Lower extremity edema    Chronic pain syndrome    Colon cancer screening  -     Cologuard Screening (Multitarget Stool DNA); Future  -     Cologuard Screening (Multitarget Stool DNA)    Other orders  -     gabapentin (NEURONTIN) 300 MG capsule; Take 1 capsule (300 mg total) by mouth 3 (three) times daily.    Assessment ast above  Will send gabapentin to pharmacy  No longer sees pain management; was previously on lyrica, norco, zanaflex  Recommend compression stockings, good support shoes, feet elevation  Cologuard send to pharmacy  Refilled pravastatin      I spent a total of 41 minutes on the day of the visit.This includes face to face time and non-face to face time preparing to see the patient (eg, review of tests), obtaining and/or reviewing separately obtained history, documenting clinical information in the electronic or other health record, independently interpreting results and communicating results to the patient/family/caregiver, or care coordinator.     Care Plan/Goals: Reviewed    Goals    None         Follow up: No follow-ups on file.

## 2022-09-21 ENCOUNTER — HISTORICAL (OUTPATIENT)
Dept: ADMINISTRATIVE | Facility: HOSPITAL | Age: 57
End: 2022-09-21
Payer: MEDICAID

## 2022-09-29 ENCOUNTER — HOSPITAL ENCOUNTER (EMERGENCY)
Facility: HOSPITAL | Age: 57
Discharge: HOME | End: 2022-09-29
Attending: STUDENT IN AN ORGANIZED HEALTH CARE EDUCATION/TRAINING PROGRAM
Payer: MEDICAID

## 2022-09-29 VITALS
WEIGHT: 176.38 LBS | OXYGEN SATURATION: 100 % | SYSTOLIC BLOOD PRESSURE: 123 MMHG | BODY MASS INDEX: 27.68 KG/M2 | HEIGHT: 67 IN | TEMPERATURE: 98 F | DIASTOLIC BLOOD PRESSURE: 84 MMHG | HEART RATE: 68 BPM | RESPIRATION RATE: 16 BRPM

## 2022-09-29 DIAGNOSIS — R07.89 CHEST WALL PAIN: Primary | ICD-10-CM

## 2022-09-29 PROCEDURE — 99284 EMERGENCY DEPT VISIT MOD MDM: CPT | Mod: 25

## 2022-09-29 RX ORDER — INDOMETHACIN 50 MG/1
50 CAPSULE ORAL
Qty: 15 CAPSULE | Refills: 0 | Status: SHIPPED | OUTPATIENT
Start: 2022-09-29 | End: 2023-04-26

## 2022-09-29 RX ORDER — TIZANIDINE 2 MG/1
2 TABLET ORAL EVERY 8 HOURS
Qty: 15 TABLET | Refills: 0 | Status: SHIPPED | OUTPATIENT
Start: 2022-09-29 | End: 2022-10-04

## 2022-09-29 NOTE — ED PROVIDER NOTES
Encounter Date: 9/29/2022       History     Chief Complaint   Patient presents with    Breast Pain     Pt w co recurrent rt breast pain > 1 yr.  States pain starts under rt arm and rad to rt breast.  Denies injury. Neg mammogram/US 3/15/22.      Francis Champagne is a 57 y.o. female who presents to the ED with complaints of right sided breast pain that starts under her arm and radiates to the R breast that started 3 day(s) ago. She reports she had a mammogram in March and it was normal. She reports the pain is worsened with palpation and movement. She denies nipple discharge, nipple inversion, breast lumps, or discoloration.        The history is provided by the patient. No  was used.   Review of patient's allergies indicates:  No Known Allergies  Past Medical History:   Diagnosis Date    Hyperlipidemia      Past Surgical History:   Procedure Laterality Date    BACK SURGERY       Family History   Problem Relation Age of Onset    Hyperlipidemia Mother     Hypertension Mother     Cancer Mother     Hyperlipidemia Maternal Aunt     Hypertension Maternal Aunt     Cancer Maternal Aunt     Hyperlipidemia Maternal Uncle     Cancer Maternal Uncle      Social History     Tobacco Use    Smoking status: Never    Smokeless tobacco: Never   Substance Use Topics    Alcohol use: Yes     Comment: ocassionally    Drug use: Never     Review of Systems   Constitutional:  Negative for chills, fatigue and fever.   HENT:  Negative for congestion, ear pain, sinus pain and sore throat.    Eyes:  Negative for pain.   Respiratory:  Negative for cough, chest tightness and shortness of breath.    Cardiovascular:  Negative for chest pain.   Gastrointestinal:  Negative for abdominal pain, constipation, diarrhea, nausea and vomiting.   Genitourinary:  Negative for dysuria, flank pain, frequency, hematuria and pelvic pain.   Musculoskeletal:  Negative for back pain and joint swelling.   Skin:  Negative for color change and rash.    Neurological:  Negative for dizziness and weakness.   Psychiatric/Behavioral:  Negative for behavioral problems and confusion.      Physical Exam     Initial Vitals [09/29/22 0816]   BP Pulse Resp Temp SpO2   123/84 68 16 97.9 °F (36.6 °C) 100 %      MAP       --         Physical Exam    Nursing note and vitals reviewed.  Constitutional: She appears well-developed and well-nourished.   HENT:   Head: Normocephalic and atraumatic.   Nose: Nose normal.   Eyes: EOM are normal. Pupils are equal, round, and reactive to light.   Neck: Neck supple. No thyromegaly present. No JVD present.   Normal range of motion.  Cardiovascular:  Normal rate, regular rhythm, normal heart sounds and intact distal pulses.           No murmur heard.  Pulmonary/Chest: Breath sounds normal. No respiratory distress. She has no wheezes. She has no rhonchi. She has no rales. She exhibits tenderness.   Right breast exhibits tenderness. Right breast exhibits no inverted nipple, no mass, no nipple discharge and no skin change.   Abdominal: Abdomen is soft. Bowel sounds are normal. She exhibits no distension. There is no abdominal tenderness. There is no rebound and no guarding.   Musculoskeletal:         General: No tenderness or edema. Normal range of motion.      Cervical back: Normal range of motion and neck supple.     Lymphadenopathy:     She has no cervical adenopathy.   Neurological: She is alert and oriented to person, place, and time.   Skin: Skin is warm and dry. Capillary refill takes less than 2 seconds.   Psychiatric: She has a normal mood and affect. Thought content normal.       ED Course   Procedures  Labs Reviewed - No data to display       Imaging Results    None          Medications - No data to display        APC / Resident Notes:   I was not physically present during the history, exam or disposition of this patient. I was available at all times for consultation. (Vitaliy)      ED Course as of 09/30/22 0148   Thu Sep 29, 2022    0908 Tenderness noted to R upper chest and not her breast tissue. The pain is worsened with palpation and movement consistent with chest wall pain/muscle strain. I gave patient strict ED precautions and she verbalized understanding. Offered lab work today and patient declined.  [VJ]      ED Course User Index  [VJ] Jamilah Calderon PA-C                 Clinical Impression:   Final diagnoses:  [R07.89] Chest wall pain (Primary)      ED Disposition Condition    Discharge Stable          ED Prescriptions       Medication Sig Dispense Start Date End Date Auth. Provider    indomethacin (INDOCIN) 50 MG capsule Take 1 capsule (50 mg total) by mouth 3 (three) times daily with meals. 15 capsule 9/29/2022 -- Jamilah Calderon PA-C    tiZANidine (ZANAFLEX) 2 MG tablet Take 1 tablet (2 mg total) by mouth every 8 (eight) hours. for 5 days 15 tablet 9/29/2022 10/4/2022 Jamilah Calderon PA-C          Follow-up Information       Follow up With Specialties Details Why Contact Info    Agnes Underwood MD Family Medicine   [address] [facility]  Gynecology Clinic  [facility] [address] [phone]      [facility] Emergency Dept Emergency Medicine In 3 days As needed, If symptoms worsen [address] [facility] [id number] [phone]             Jamilah Calderon PA-C  09/29/22 0912       Nahid Burks MD  09/30/22 0149

## 2022-10-01 ENCOUNTER — HOSPITAL ENCOUNTER (EMERGENCY)
Facility: HOSPITAL | Age: 57
Discharge: HOME | End: 2022-10-01
Attending: EMERGENCY MEDICINE
Payer: MEDICAID

## 2022-10-01 VITALS
HEART RATE: 18 BPM | HEIGHT: 67 IN | BODY MASS INDEX: 27.68 KG/M2 | TEMPERATURE: 98 F | SYSTOLIC BLOOD PRESSURE: 130 MMHG | RESPIRATION RATE: 18 BRPM | OXYGEN SATURATION: 99 % | DIASTOLIC BLOOD PRESSURE: 72 MMHG | WEIGHT: 176.38 LBS

## 2022-10-01 DIAGNOSIS — S29.011D CHEST WALL MUSCLE STRAIN, SUBSEQUENT ENCOUNTER: Primary | ICD-10-CM

## 2022-10-01 PROCEDURE — 96372 THER/PROPH/DIAG INJ SC/IM: CPT | Performed by: PHYSICIAN ASSISTANT

## 2022-10-01 PROCEDURE — 99284 EMERGENCY DEPT VISIT MOD MDM: CPT

## 2022-10-01 PROCEDURE — 63600175 PHARM REV CODE 636 W HCPCS: Performed by: PHYSICIAN ASSISTANT

## 2022-10-01 RX ORDER — METHYLPREDNISOLONE 4 MG/1
TABLET ORAL
Qty: 1 EACH | Refills: 0 | Status: SHIPPED | OUTPATIENT
Start: 2022-10-01 | End: 2023-04-26

## 2022-10-01 RX ORDER — KETOROLAC TROMETHAMINE 30 MG/ML
60 INJECTION, SOLUTION INTRAMUSCULAR; INTRAVENOUS
Status: COMPLETED | OUTPATIENT
Start: 2022-10-01 | End: 2022-10-01

## 2022-10-01 RX ADMIN — KETOROLAC TROMETHAMINE 60 MG: 30 INJECTION, SOLUTION INTRAMUSCULAR; INTRAVENOUS at 11:10

## 2022-10-01 NOTE — Clinical Note
"Francis Montemayorbassam Champagne was seen and treated in our emergency department on 10/1/2022.  She may return to work on 10/03/2022.       If you have any questions or concerns, please don't hesitate to call.      CAROL Boone"

## 2022-10-01 NOTE — ED PROVIDER NOTES
Encounter Date: 10/1/2022       History     Chief Complaint   Patient presents with    Shoulder Pain     To the ED with c/o unrelieved right shoulder pain after lifting a heavy object. Seen Thursday for same.      56 YO AAF in ER with complaints of continued pain to right chest wall and no improvement with medications prescribed on 9/29/22. She states she worked yesterday and lifting on some heavy boxes and cases of water and it seems to have made the pain worse. Asking for a shot for pain if possible. Denies fever, chills, chest pain, SOB, abdominal pain, N/V/D, HA or dizziness. No other complaints.     The history is provided by the patient.   Review of patient's allergies indicates:  No Known Allergies  Past Medical History:   Diagnosis Date    Hyperlipidemia      Past Surgical History:   Procedure Laterality Date    BACK SURGERY       Family History   Problem Relation Age of Onset    Hyperlipidemia Mother     Hypertension Mother     Cancer Mother     Hyperlipidemia Maternal Aunt     Hypertension Maternal Aunt     Cancer Maternal Aunt     Hyperlipidemia Maternal Uncle     Cancer Maternal Uncle      Social History     Tobacco Use    Smoking status: Never    Smokeless tobacco: Never   Substance Use Topics    Alcohol use: Yes     Comment: ocassionally    Drug use: Never     Review of Systems   Constitutional:  Negative for chills and fever.   HENT:  Negative for congestion and sore throat.    Respiratory:  Negative for shortness of breath.    Cardiovascular:  Negative for chest pain and palpitations.   Gastrointestinal:  Negative for abdominal pain, diarrhea, nausea and vomiting.   Genitourinary:  Negative for dysuria.   Musculoskeletal:  Positive for myalgias. Negative for back pain.   Skin:  Negative for rash.   Neurological:  Negative for dizziness, weakness, light-headedness and headaches.   Hematological:  Does not bruise/bleed easily.   All other systems reviewed and are negative.    Physical Exam     Initial  Vitals [10/01/22 1045]   BP Pulse Resp Temp SpO2   (!) 144/92 66 17 98.2 °F (36.8 °C) 100 %      MAP       --         Physical Exam    Nursing note and vitals reviewed.  Constitutional: She appears well-developed and well-nourished. She is not diaphoretic. No distress.   HENT:   Head: Normocephalic and atraumatic.   Mouth/Throat: Oropharynx is clear and moist.   Eyes: Conjunctivae are normal.   Cardiovascular:  Normal rate, regular rhythm, normal heart sounds and intact distal pulses.           Pulmonary/Chest: Breath sounds normal. She exhibits tenderness (ttp in right pectoralis muscle of upper chest and right trapezius).   Musculoskeletal:         General: No edema. Normal range of motion.     Neurological: She is alert and oriented to person, place, and time. She has normal strength.   Skin: Skin is warm and dry.   Psychiatric: She has a normal mood and affect.       ED Course   Procedures  Labs Reviewed - No data to display       Imaging Results    None          Medications   ketorolac injection 60 mg (60 mg Intramuscular Given 10/1/22 1140)     Medical Decision Making:   History:   Old Medical Records: I decided to obtain old medical records.  Old Records Summarized: other records.       <> Summary of Records: ER record reviewed from 9/29/2022, pt was Rx zanaflex and indomethacin                        Clinical Impression:   Final diagnoses:  [S29.011D] Chest wall muscle strain, subsequent encounter (Primary)        ED Disposition Condition    Discharge Stable          ED Prescriptions       Medication Sig Dispense Start Date End Date Auth. Provider    methylPREDNISolone (MEDROL DOSEPACK) 4 mg tablet Take as package instructs 1 each 10/1/2022 -- CAROL Boone          Follow-up Information       Follow up With Specialties Details Why Contact Info    Agnes Underwood MD Family Medicine Schedule an appointment as soon as possible for a visit in 3 days  [address] [facility]  Gynecology Clinic  [facility]  [address]  [phone]      [facility] Emergency Medicine In 3 days As needed, If symptoms worsen [address] [facility] [id number] [phone]             CAROL Boone  10/01/22 1147

## 2022-10-01 NOTE — DISCHARGE INSTRUCTIONS
Take all medications as prescribed.     Follow up with your PCP in 3-5 days.     Use heat to affected area as directed with a rice sock/bag.    Return to ER for any worsening or changes in symptoms.

## 2023-01-05 ENCOUNTER — OFFICE VISIT (OUTPATIENT)
Dept: SURGERY | Facility: CLINIC | Age: 58
End: 2023-01-05
Payer: MEDICAID

## 2023-01-05 VITALS
OXYGEN SATURATION: 95 % | TEMPERATURE: 98 F | WEIGHT: 170.63 LBS | HEART RATE: 82 BPM | RESPIRATION RATE: 18 BRPM | DIASTOLIC BLOOD PRESSURE: 82 MMHG | SYSTOLIC BLOOD PRESSURE: 115 MMHG | BODY MASS INDEX: 26.78 KG/M2 | HEIGHT: 67 IN

## 2023-01-05 DIAGNOSIS — N64.4 BREAST PAIN: Primary | ICD-10-CM

## 2023-01-05 PROCEDURE — 99214 OFFICE O/P EST MOD 30 MIN: CPT | Mod: PBBFAC

## 2023-01-05 NOTE — PROGRESS NOTES
I have reviewed the notes, assessments, and/or procedures performed by the resident, I concur with her/his documentation of Francis Champagne.     Lalitha Villalobos MD    Mastodynia resolved. Imaging wnl. Can follow up prn in breast clinic, continue routine screening mammograms.

## 2023-01-05 NOTE — LETTER
January 5, 2023      [facility]  [address] [facility] [address]  Phone: [phone]       Patient: Francis Champagne   YOB: 1965  Date of Visit: 01/05/2023    To Whom It May Concern:    Jed Champagne  was at [facility] on 01/05/2023. The patient may return to work/school on 01/05/2023, with no restrictions. If you have any questions or concerns, or if I can be of further assistance, please do not hesitate to contact me.    Sincerely,    Vandana Bauer LPN

## 2023-01-05 NOTE — PROGRESS NOTES
[facility] General Surgery Clinic Note    CC: follow up breast pain     HPI: Francis Champagne is a 57 y.o. with 6mo follow-up for breast ultrasound. No surgical history.    Pt seen in 3/22 for R side breast and chest wall soreness. U/S and mammogram normal at that time.   In 6/22 pt was seen in clinic and breast pain had resolved at that time.     Today pt says pain has not been bothering her whatsoever. Denies skin changes, nipple discharge, fever, or unintentional weight loss.    Reports family history of breast, uterine, and colon cancer on both mother and father's side.     Objective:    Vitals:  Vitals:    01/05/23 1207   BP: 115/82   Pulse: 82   Resp: 18   Temp: 98.3 °F (36.8 °C)          Physical Exam:  Gen: NAD  Neuro: awake, alert, answering questions appropriately  CV: RRR  Breast (bilateral): no palpable masses, no nipple discharge or inversion, no axillary or supraclavicular lymphadenopathy  Resp: non-labored breathing  Abd: soft, ND, NT  Ext: moves all 4 spontaneously and purposefully  Skin: warm, well perfused    Pertinent Labs:  None    Imaging:  Mammo Digital Diagnostic Bilat with Gerber (3/15/22) - Negative  US Right Breast (3/15/22) - Negative    Micro/Path/Other:  None    Assessment/Plan:  57F with no surgical history, family history of HER2-/BRCA- breast cancer, and negative mammogram and U/S presenting with improved R chest wall discomfort at rest     - Resume regular screening for mammogram  - Follow up with primary care    MEENAKSHI Gamboa, MS3  01/05/2023 12:23 PM     RESIDENT ADDENDUM:  Patient seen and examined by me personally.  The above note has been edited to reflect my findings.    57 F with history of breast pain, now resolved.  No intervention needed.  Resume regular screening.        Kathrin Borja MD  [facility] General Surgery, PGY5

## 2023-01-06 ENCOUNTER — TELEPHONE (OUTPATIENT)
Dept: RADIOLOGY | Facility: HOSPITAL | Age: 58
End: 2023-01-06

## 2023-01-12 ENCOUNTER — TELEPHONE (OUTPATIENT)
Dept: RADIOLOGY | Facility: HOSPITAL | Age: 58
End: 2023-01-12

## 2023-04-19 ENCOUNTER — DOCUMENTATION ONLY (OUTPATIENT)
Dept: ADMINISTRATIVE | Facility: HOSPITAL | Age: 58
End: 2023-04-19
Payer: MEDICAID

## 2023-04-26 ENCOUNTER — HOSPITAL ENCOUNTER (OUTPATIENT)
Dept: RADIOLOGY | Facility: HOSPITAL | Age: 58
Discharge: HOME | End: 2023-04-26
Attending: STUDENT IN AN ORGANIZED HEALTH CARE EDUCATION/TRAINING PROGRAM
Payer: MEDICAID

## 2023-04-26 ENCOUNTER — OFFICE VISIT (OUTPATIENT)
Dept: FAMILY MEDICINE | Facility: CLINIC | Age: 58
End: 2023-04-26
Payer: MEDICAID

## 2023-04-26 VITALS
RESPIRATION RATE: 18 BRPM | SYSTOLIC BLOOD PRESSURE: 121 MMHG | DIASTOLIC BLOOD PRESSURE: 84 MMHG | HEART RATE: 81 BPM | WEIGHT: 172 LBS | BODY MASS INDEX: 26.07 KG/M2 | HEIGHT: 68 IN | OXYGEN SATURATION: 99 % | TEMPERATURE: 98 F

## 2023-04-26 DIAGNOSIS — Z11.4 ENCOUNTER FOR SCREENING FOR HIV: ICD-10-CM

## 2023-04-26 DIAGNOSIS — G89.4 CHRONIC PAIN SYNDROME: Primary | ICD-10-CM

## 2023-04-26 DIAGNOSIS — Z11.59 ENCOUNTER FOR HEPATITIS C SCREENING TEST FOR LOW RISK PATIENT: ICD-10-CM

## 2023-04-26 DIAGNOSIS — Z23 IMMUNIZATION DUE: ICD-10-CM

## 2023-04-26 DIAGNOSIS — G89.29 CHRONIC MIDLINE LOW BACK PAIN WITHOUT SCIATICA: ICD-10-CM

## 2023-04-26 DIAGNOSIS — Z12.31 SCREENING MAMMOGRAM FOR BREAST CANCER: ICD-10-CM

## 2023-04-26 DIAGNOSIS — G47.00 INSOMNIA, UNSPECIFIED TYPE: ICD-10-CM

## 2023-04-26 DIAGNOSIS — M54.50 CHRONIC MIDLINE LOW BACK PAIN WITHOUT SCIATICA: ICD-10-CM

## 2023-04-26 DIAGNOSIS — E78.5 HYPERLIPIDEMIA, UNSPECIFIED HYPERLIPIDEMIA TYPE: ICD-10-CM

## 2023-04-26 PROCEDURE — 96372 THER/PROPH/DIAG INJ SC/IM: CPT | Mod: PBBFAC

## 2023-04-26 PROCEDURE — 77063 BREAST TOMOSYNTHESIS BI: CPT | Mod: 26,,, | Performed by: RADIOLOGY

## 2023-04-26 PROCEDURE — 90686 IIV4 VACC NO PRSV 0.5 ML IM: CPT | Mod: PBBFAC

## 2023-04-26 PROCEDURE — 3008F BODY MASS INDEX DOCD: CPT | Mod: CPTII,,, | Performed by: FAMILY MEDICINE

## 2023-04-26 PROCEDURE — 77063 MAMMO DIGITAL SCREENING BILAT WITH TOMO: ICD-10-PCS | Mod: 26,,, | Performed by: RADIOLOGY

## 2023-04-26 PROCEDURE — 3079F DIAST BP 80-89 MM HG: CPT | Mod: CPTII,,, | Performed by: FAMILY MEDICINE

## 2023-04-26 PROCEDURE — 3074F PR MOST RECENT SYSTOLIC BLOOD PRESSURE < 130 MM HG: ICD-10-PCS | Mod: CPTII,,, | Performed by: FAMILY MEDICINE

## 2023-04-26 PROCEDURE — 3008F PR BODY MASS INDEX (BMI) DOCUMENTED: ICD-10-PCS | Mod: CPTII,,, | Performed by: FAMILY MEDICINE

## 2023-04-26 PROCEDURE — 90750 HZV VACC RECOMBINANT IM: CPT | Mod: PBBFAC

## 2023-04-26 PROCEDURE — 1159F MED LIST DOCD IN RCRD: CPT | Mod: CPTII,,, | Performed by: FAMILY MEDICINE

## 2023-04-26 PROCEDURE — 77067 MAMMO DIGITAL SCREENING BILAT WITH TOMO: ICD-10-PCS | Mod: 26,,, | Performed by: RADIOLOGY

## 2023-04-26 PROCEDURE — 3074F SYST BP LT 130 MM HG: CPT | Mod: CPTII,,, | Performed by: FAMILY MEDICINE

## 2023-04-26 PROCEDURE — 77067 SCR MAMMO BI INCL CAD: CPT | Mod: 26,,, | Performed by: RADIOLOGY

## 2023-04-26 PROCEDURE — 90472 IMMUNIZATION ADMIN EACH ADD: CPT | Mod: PBBFAC

## 2023-04-26 PROCEDURE — 99214 PR OFFICE/OUTPT VISIT, EST, LEVL IV, 30-39 MIN: ICD-10-PCS | Mod: S$PBB,,, | Performed by: FAMILY MEDICINE

## 2023-04-26 PROCEDURE — 99214 OFFICE O/P EST MOD 30 MIN: CPT | Mod: S$PBB,,, | Performed by: FAMILY MEDICINE

## 2023-04-26 PROCEDURE — 1159F PR MEDICATION LIST DOCUMENTED IN MEDICAL RECORD: ICD-10-PCS | Mod: CPTII,,, | Performed by: FAMILY MEDICINE

## 2023-04-26 PROCEDURE — 1160F RVW MEDS BY RX/DR IN RCRD: CPT | Mod: CPTII,,, | Performed by: FAMILY MEDICINE

## 2023-04-26 PROCEDURE — 77067 SCR MAMMO BI INCL CAD: CPT | Mod: TC

## 2023-04-26 PROCEDURE — 90471 IMMUNIZATION ADMIN: CPT | Mod: PBBFAC

## 2023-04-26 PROCEDURE — 99214 OFFICE O/P EST MOD 30 MIN: CPT | Mod: 25,PBBFAC | Performed by: FAMILY MEDICINE

## 2023-04-26 PROCEDURE — 3079F PR MOST RECENT DIASTOLIC BLOOD PRESSURE 80-89 MM HG: ICD-10-PCS | Mod: CPTII,,, | Performed by: FAMILY MEDICINE

## 2023-04-26 PROCEDURE — 1160F PR REVIEW ALL MEDS BY PRESCRIBER/CLIN PHARMACIST DOCUMENTED: ICD-10-PCS | Mod: CPTII,,, | Performed by: FAMILY MEDICINE

## 2023-04-26 RX ORDER — KETOROLAC TROMETHAMINE 30 MG/ML
15 INJECTION, SOLUTION INTRAMUSCULAR; INTRAVENOUS
Status: COMPLETED | OUTPATIENT
Start: 2023-04-26 | End: 2023-04-26

## 2023-04-26 RX ORDER — PRAVASTATIN SODIUM 40 MG/1
40 TABLET ORAL DAILY
Qty: 90 TABLET | Refills: 1 | Status: SHIPPED | OUTPATIENT
Start: 2023-04-26

## 2023-04-26 RX ORDER — TIZANIDINE 4 MG/1
4 TABLET ORAL NIGHTLY PRN
Qty: 30 TABLET | Refills: 0 | Status: SHIPPED | OUTPATIENT
Start: 2023-04-26

## 2023-04-26 RX ORDER — GABAPENTIN 100 MG/1
100 CAPSULE ORAL 3 TIMES DAILY
Qty: 90 CAPSULE | Refills: 2 | Status: SHIPPED | OUTPATIENT
Start: 2023-04-26 | End: 2023-08-23 | Stop reason: SDUPTHER

## 2023-04-26 RX ORDER — TRAZODONE HYDROCHLORIDE 50 MG/1
50 TABLET ORAL NIGHTLY PRN
Qty: 30 TABLET | Refills: 2 | Status: SHIPPED | OUTPATIENT
Start: 2023-04-26

## 2023-04-26 RX ORDER — MELOXICAM 7.5 MG/1
7.5 TABLET ORAL DAILY
Qty: 30 TABLET | Refills: 2 | Status: SHIPPED | OUTPATIENT
Start: 2023-04-26

## 2023-04-26 RX ADMIN — ZOSTER VACCINE RECOMBINANT, ADJUVANTED 0.5 ML: KIT at 08:04

## 2023-04-26 RX ADMIN — INFLUENZA VIRUS VACCINE 0.5 ML: 15; 15; 15; 15 SUSPENSION INTRAMUSCULAR at 08:04

## 2023-04-26 RX ADMIN — KETOROLAC TROMETHAMINE 15 MG: 30 INJECTION, SOLUTION INTRAMUSCULAR; INTRAVENOUS at 08:04

## 2023-04-26 NOTE — PROGRESS NOTES
Patient Name: Francis Champagne   : 1965  MRN: [id number]     SUBJECTIVE:  Francis Champagne is a 57 y.o. female here for Follow-up (Patient c/o of back pain since surgery 2-3 years)      HPI  Here for routine follow-up.  Past medical history of insomnia, hyperlipidemia, chronic back pain.  Taking pravastatin and trazodone. Off of pravastatin for few months. Trazodone from back surgeon, has few left.    Complains of chronic back pain since she had surgery 2-3 years ago. Used to be on gabapentin and zanaflex. Used to follow up with pain clinic. Last year stopped seeing them. Used to be on lyrica 75 mg BID and on norco. Only having zanaflex left.  Started to work and back is bothering again. Low back, non radiating. Taking Zanaflex on the weekend only. Not taking lyrica anymore. Taking Advil every 6 hours.     Follows with breast clinic for breast pain, with family history of breast, uterine and colon cancer in both mother and father's side.  Resolved breast pain.  Just regular screening mammogram, scheduled next one today.  Pap smear 2 years ago with negative hpv. Has a gynecologist.  Fit test negative 3 years ago        ALLERGIES: Review of patient's allergies indicates:  No Known Allergies      ROS:  Review of Systems   Constitutional:  Negative for chills, fever and weight loss.   HENT:  Negative for congestion.    Eyes:  Negative for blurred vision.   Respiratory:  Negative for cough and shortness of breath.    Cardiovascular:  Negative for chest pain, palpitations and leg swelling.   Gastrointestinal:  Negative for abdominal pain, nausea and vomiting.   Genitourinary:  Negative for dysuria and hematuria.   Musculoskeletal:  Positive for back pain.   Skin:  Negative for rash.   Neurological:  Negative for focal weakness and headaches.   Psychiatric/Behavioral:  Negative for depression. The patient has insomnia. The patient is not nervous/anxious.        OBJECTIVE:  Vital signs  There were no vitals filed for this  visit.   There is no height or weight on file to calculate BMI.    PHYSICAL EXAM:   Physical Exam  Vitals reviewed.   Constitutional:       General: She is not in acute distress.     Appearance: Normal appearance. She is not ill-appearing.   HENT:      Head: Normocephalic and atraumatic.      Right Ear: External ear normal.      Left Ear: External ear normal.      Nose: Nose normal.      Mouth/Throat:      Mouth: Mucous membranes are moist.   Eyes:      General: No scleral icterus.        Right eye: No discharge.         Left eye: No discharge.      Conjunctiva/sclera: Conjunctivae normal.      Pupils: Pupils are equal, round, and reactive to light.   Cardiovascular:      Rate and Rhythm: Normal rate and regular rhythm.      Heart sounds: No murmur heard.  Pulmonary:      Effort: Pulmonary effort is normal. No respiratory distress.      Breath sounds: No wheezing or rhonchi.   Abdominal:      General: Bowel sounds are normal. There is no distension.      Palpations: Abdomen is soft.      Tenderness: There is no abdominal tenderness.   Musculoskeletal:         General: Tenderness (lumbar spine prevertebral muscle tenderness) present. Normal range of motion.      Cervical back: Normal range of motion and neck supple. No rigidity or tenderness.      Right lower leg: No edema.      Left lower leg: No edema.   Skin:     General: Skin is warm.      Findings: No rash.   Neurological:      General: No focal deficit present.      Mental Status: She is alert and oriented to person, place, and time.   Psychiatric:         Mood and Affect: Mood normal.         Behavior: Behavior normal.        ASSESSMENT/PLAN:  Francis was seen today for follow-up.    Diagnoses and all orders for this visit:    Chronic pain syndrome    Chronic midline low back pain without sciatica  -     gabapentin (NEURONTIN) 100 MG capsule; Take 1 capsule (100 mg total) by mouth 3 (three) times daily.  -     X-Ray Lumbar Spine 2 Or 3 Views; Future  -      meloxicam (MOBIC) 7.5 MG tablet; Take 1 tablet (7.5 mg total) by mouth once daily.  -     tiZANidine (ZANAFLEX) 4 MG tablet; Take 1 tablet (4 mg total) by mouth nightly as needed (spasm).  -     ketorolac injection 15 mg    Insomnia, unspecified type  -     traZODone (DESYREL) 50 MG tablet; Take 1 tablet (50 mg total) by mouth nightly as needed for Insomnia.    Hyperlipidemia, unspecified hyperlipidemia type  -     pravastatin (PRAVACHOL) 40 MG tablet; Take 1 tablet (40 mg total) by mouth once daily.  -     CBC Auto Differential; Future  -     Comprehensive Metabolic Panel; Future  -     Lipid Panel; Future  -     Hemoglobin A1C; Future    Encounter for hepatitis C screening test for low risk patient  -     Hepatitis C Antibody; Future    Encounter for screening for HIV  -     HIV 1/2 Ag/Ab (4th Gen); Future    Immunization due  -     varicella-zoster gE-AS01B (PF)(SHINGRIX) 50 mcg/0.5 mL injection  -     influenza (QUADRIVALENT PF) vaccine 0.5 mL    Plan  -Toradol injection given in the office as it has improved the back pain in the past.  Advised patient to avoid any NSAIDs for today.  Start Mobic from tomorrow as needed for pain.  Gabapentin and Zanaflex .  Might increase dose of gabapentin next appointment if needed.  Recheck an x-ray of the lumbar spine.  No red flags at this time.  -trazodone 50 mg for insomnia.  No anxiety or depression.  -resume pravastatin for hyperlipidemia watch diet.  Check basic labs.  -updated preventive healthcare.  Immunizations given.           Previous medical history/lab work/radiology reviewed and considered during medical management decisions.   Medication list reviewed and medication reconciliation performed.  Patient was provided  and care about his/her current diagnosis (es) and medications including risk/benefit and side effects/adverse events, over the counter medication uses/doses, home self-care and contact precautions,  and red flags and indications for when to  seek immediate medical attention.   Patient was advised to continue compliance with current medication list and medical recommendations.  Recommended/ Advised continued compliance with recommended eating habits/ diets for medical conditions and exercise 150 minutes/ week (if possible) for medical condition (s).        RESULTS:  No results found for this or any previous visit (from the past 1008 hour(s)).      Follow Up:   In 3 months for back and insomnia    [unfilled]    This note was created with the assistance of a voice recognition software or phone dictation. There may be transcription errors as a result of using this technology however minimal. Effort has been made to assure accuracy of transcription but any obvious errors or omissions should be clarified with the author of the document

## 2023-05-09 ENCOUNTER — PATIENT MESSAGE (OUTPATIENT)
Dept: RESEARCH | Facility: HOSPITAL | Age: 58
End: 2023-05-09
Payer: MEDICAID

## 2023-05-13 PROBLEM — E78.5 HYPERLIPIDEMIA: Status: ACTIVE | Noted: 2023-05-13

## 2023-05-13 PROBLEM — G89.4 CHRONIC PAIN SYNDROME: Status: ACTIVE | Noted: 2023-05-13

## 2023-05-13 PROBLEM — G47.00 INSOMNIA: Status: ACTIVE | Noted: 2023-05-13

## 2023-05-13 PROBLEM — G89.29 CHRONIC MIDLINE LOW BACK PAIN WITHOUT SCIATICA: Status: ACTIVE | Noted: 2023-05-13

## 2023-05-13 PROBLEM — M54.50 CHRONIC MIDLINE LOW BACK PAIN WITHOUT SCIATICA: Status: ACTIVE | Noted: 2023-05-13

## 2023-05-15 ENCOUNTER — TELEPHONE (OUTPATIENT)
Dept: FAMILY MEDICINE | Facility: CLINIC | Age: 58
End: 2023-05-15
Payer: MEDICAID

## 2023-05-15 NOTE — TELEPHONE ENCOUNTER
Called patient via phone call and patient understands results given. No further questions at this time.     ----- Message from Lily Munoz MD sent at 5/13/2023  3:45 PM CDT -----  Labs unremarkable, except for high lipids.  Encourage compliance with pravastatin that she is back on.  Watch diet.  Thanks

## 2023-05-18 ENCOUNTER — PATIENT MESSAGE (OUTPATIENT)
Dept: ADMINISTRATIVE | Facility: HOSPITAL | Age: 58
End: 2023-05-18
Payer: MEDICAID

## 2023-08-23 DIAGNOSIS — M54.50 CHRONIC MIDLINE LOW BACK PAIN WITHOUT SCIATICA: ICD-10-CM

## 2023-08-23 DIAGNOSIS — G89.29 CHRONIC MIDLINE LOW BACK PAIN WITHOUT SCIATICA: ICD-10-CM

## 2023-08-23 RX ORDER — GABAPENTIN 100 MG/1
100 CAPSULE ORAL 3 TIMES DAILY
Qty: 90 CAPSULE | Refills: 0 | Status: SHIPPED | OUTPATIENT
Start: 2023-08-23

## 2023-10-07 ENCOUNTER — HOSPITAL ENCOUNTER (EMERGENCY)
Facility: HOSPITAL | Age: 58
Discharge: HOME | End: 2023-10-07
Attending: EMERGENCY MEDICINE

## 2023-10-07 VITALS
RESPIRATION RATE: 20 BRPM | BODY MASS INDEX: 26.8 KG/M2 | HEART RATE: 75 BPM | OXYGEN SATURATION: 100 % | WEIGHT: 176.81 LBS | TEMPERATURE: 98 F | HEIGHT: 68 IN | DIASTOLIC BLOOD PRESSURE: 92 MMHG | SYSTOLIC BLOOD PRESSURE: 139 MMHG

## 2023-10-07 DIAGNOSIS — R07.89 RIGHT-SIDED CHEST WALL PAIN: ICD-10-CM

## 2023-10-07 DIAGNOSIS — R07.89 MUSCULOSKELETAL CHEST PAIN: Primary | ICD-10-CM

## 2023-10-07 PROCEDURE — 63600175 PHARM REV CODE 636 W HCPCS: Performed by: PHYSICIAN ASSISTANT

## 2023-10-07 PROCEDURE — 99284 EMERGENCY DEPT VISIT MOD MDM: CPT | Mod: 25

## 2023-10-07 PROCEDURE — 25000003 PHARM REV CODE 250: Performed by: PHYSICIAN ASSISTANT

## 2023-10-07 PROCEDURE — 96372 THER/PROPH/DIAG INJ SC/IM: CPT | Performed by: PHYSICIAN ASSISTANT

## 2023-10-07 RX ORDER — BACLOFEN 10 MG/1
10 TABLET ORAL 3 TIMES DAILY PRN
Qty: 30 TABLET | Refills: 0 | Status: SHIPPED | OUTPATIENT
Start: 2023-10-07 | End: 2023-10-17

## 2023-10-07 RX ORDER — METHOCARBAMOL 500 MG/1
500 TABLET, FILM COATED ORAL
Status: COMPLETED | OUTPATIENT
Start: 2023-10-07 | End: 2023-10-07

## 2023-10-07 RX ORDER — KETOROLAC TROMETHAMINE 30 MG/ML
30 INJECTION, SOLUTION INTRAMUSCULAR; INTRAVENOUS
Status: COMPLETED | OUTPATIENT
Start: 2023-10-07 | End: 2023-10-07

## 2023-10-07 RX ADMIN — METHOCARBAMOL 500 MG: 500 TABLET ORAL at 11:10

## 2023-10-07 RX ADMIN — KETOROLAC TROMETHAMINE 30 MG: 30 INJECTION, SOLUTION INTRAMUSCULAR; INTRAVENOUS at 11:10

## 2023-10-07 NOTE — ED PROVIDER NOTES
Encounter Date: 10/7/2023       History     Chief Complaint   Patient presents with    Breast Pain     C/o right breast pain. Denies any redness, injury, discharge. X 1 week. States picked up a heavy wheel chair     Francis Champagne is a 58 y.o. female who presents to the ED complaining of right breast/side pain x 5 days. Patient reports lifting a heavy wheelchair on Monday and developed pain in her right breast and right side under her arm the following day. Reports pain is worse with movement of right arm and when she puts pressure on her right side. Has not tried taking anything for the pain. She denies chest pain, SOB, palpitations, cough, N/V/D, skin changes.     The history is provided by the patient.     Review of patient's allergies indicates:  No Known Allergies  Past Medical History:   Diagnosis Date    Hyperlipidemia      Past Surgical History:   Procedure Laterality Date    BACK SURGERY       Family History   Problem Relation Age of Onset    Hyperlipidemia Mother     Hypertension Mother     Ovarian cancer Mother     Colon cancer Paternal Grandmother     Hyperlipidemia Maternal Aunt     Hypertension Maternal Aunt     Ovarian cancer Maternal Aunt     Hyperlipidemia Maternal Uncle     Prostate cancer Maternal Uncle      Social History     Tobacco Use    Smoking status: Never    Smokeless tobacco: Never   Substance Use Topics    Alcohol use: Yes     Comment: ocassionally    Drug use: Never     Review of Systems   Constitutional:  Negative for chills and fever.   HENT:  Negative for congestion and sore throat.    Respiratory:  Negative for cough and shortness of breath.    Cardiovascular:  Negative for chest pain and leg swelling.   Gastrointestinal:  Negative for abdominal pain and nausea.   Genitourinary:  Negative for dysuria and frequency.   Musculoskeletal:  Positive for myalgias. Negative for arthralgias and back pain.   Skin:  Negative for rash and wound.   Neurological:  Negative for dizziness and  weakness.   Hematological:  Does not bruise/bleed easily.       Physical Exam     Initial Vitals [10/07/23 1029]   BP Pulse Resp Temp SpO2   (!) 139/92 75 20 98.1 °F (36.7 °C) 100 %      MAP       --         Physical Exam    Nursing note and vitals reviewed.  Constitutional: She appears well-developed and well-nourished. No distress.   HENT:   Head: Normocephalic and atraumatic.   Mouth/Throat: No oropharyngeal exudate.   Eyes: EOM are normal. No scleral icterus.   Neck: Neck supple.   Normal range of motion.  Cardiovascular:  Normal rate and regular rhythm.           No murmur heard.  Pulmonary/Chest: Breath sounds normal. No respiratory distress. She has no wheezes.   Abdominal: Abdomen is soft. Bowel sounds are normal. She exhibits no distension. There is no abdominal tenderness.   Musculoskeletal:         General: Tenderness (right lateral breast and thoracic region) present. Normal range of motion.      Cervical back: Normal range of motion and neck supple.     Neurological: She is alert and oriented to person, place, and time. No cranial nerve deficit.   Skin: Skin is warm and dry. Capillary refill takes less than 2 seconds. No erythema.   No breast skin changes, mass, or nipple drainage. No ecchymosis or crepitus noted to ribs.    Psychiatric: She has a normal mood and affect. Her behavior is normal. Judgment and thought content normal.         ED Course   Procedures  Labs Reviewed - No data to display       Imaging Results              X-Ray Chest AP Portable (Final result)  Result time 10/07/23 11:59:47      Final result by Brady Irene MD (10/07/23 11:59:47)                   Impression:      No acute findings.      Electronically signed by: Brady Irene  Date:    10/07/2023  Time:    11:59               Narrative:    EXAMINATION:  XR CHEST AP PORTABLE    CLINICAL HISTORY:  Other chest pain    COMPARISON:  No priors    FINDINGS:  Portable frontal view of the chest was obtained. The heart is not  enlarged.  Lungs are clear.  There is no pneumothorax or significant effusion.                                       Medications   ketorolac injection 30 mg (30 mg Intramuscular Given 10/7/23 1151)   methocarbamoL tablet 500 mg (500 mg Oral Given 10/7/23 1151)     Medical Decision Making  Amount and/or Complexity of Data Reviewed  Radiology: ordered. Decision-making details documented in ED Course.    Risk  Prescription drug management.               ED Course as of 10/07/23 1218   Sat Oct 07, 2023   1204 X-Ray Chest AP Portable  No acute findings. [KD]      ED Course User Index  [KD] Tatiana Garrido PA-C               Medical Decision Making:   Initial Assessment:   Resting comfortably in NAD. HDS and afebrile.   Differential Diagnosis:   Muscle strain, rib contusion, pleurisy, pneumonia  Clinical Tests:   Radiological Study: Ordered and Reviewed  ED Management:  CXR without acute displaced rib fractures or consolidation. Pt pain is reproducible on exam and improved with symptomatic management. Stable for discharge home. Already has meloxicam which she has not tried taking, will add baclofen. Encouraged close follow up with PCP. ED return precautions given for any new or owrsening symptoms. She verbalized understanding. All questions answered.       Clinical Impression:   Final diagnoses:  [R07.89] Right-sided chest wall pain  [R07.89] Musculoskeletal chest pain (Primary)        ED Disposition Condition    Discharge Stable          ED Prescriptions       Medication Sig Dispense Start Date End Date Auth. Provider    baclofen (LIORESAL) 10 MG tablet Take 1 tablet (10 mg total) by mouth 3 (three) times daily as needed (pain, spasms). 30 tablet 10/7/2023 10/17/2023 Tatiana Garrido PA-C          Follow-up Information       Follow up With Specialties Details Why Contact Info    [facility] Emergency Medicine  If symptoms worsen [address] [facility]  [address]  [phone]    Lily Munoz MD Family Medicine In 3 days Hospital follow up [address] [facility] [address]  [phone]               Tatiana Garrido PA-C  10/07/23 1216